# Patient Record
Sex: FEMALE | HISPANIC OR LATINO | Employment: FULL TIME | URBAN - METROPOLITAN AREA
[De-identification: names, ages, dates, MRNs, and addresses within clinical notes are randomized per-mention and may not be internally consistent; named-entity substitution may affect disease eponyms.]

---

## 2022-04-15 PROCEDURE — U0004 COV-19 TEST NON-CDC HGH THRU: HCPCS | Performed by: FAMILY MEDICINE

## 2023-01-04 ENCOUNTER — LAB (OUTPATIENT)
Dept: LAB | Facility: HOSPITAL | Age: 38
End: 2023-01-04
Payer: MEDICAID

## 2023-01-04 PROCEDURE — 80053 COMPREHEN METABOLIC PANEL: CPT | Performed by: NURSE PRACTITIONER

## 2023-01-04 PROCEDURE — 85025 COMPLETE CBC W/AUTO DIFF WBC: CPT | Performed by: NURSE PRACTITIONER

## 2023-01-06 ENCOUNTER — OFFICE VISIT (OUTPATIENT)
Dept: INTERNAL MEDICINE | Facility: CLINIC | Age: 38
End: 2023-01-06
Payer: MEDICAID

## 2023-01-06 ENCOUNTER — LAB (OUTPATIENT)
Dept: LAB | Facility: HOSPITAL | Age: 38
End: 2023-01-06
Payer: MEDICAID

## 2023-01-06 VITALS
BODY MASS INDEX: 29.97 KG/M2 | HEART RATE: 64 BPM | DIASTOLIC BLOOD PRESSURE: 70 MMHG | TEMPERATURE: 97.7 F | HEIGHT: 58 IN | SYSTOLIC BLOOD PRESSURE: 122 MMHG | WEIGHT: 142.8 LBS

## 2023-01-06 DIAGNOSIS — L50.9 URTICARIA: Primary | ICD-10-CM

## 2023-01-06 DIAGNOSIS — Z13.220 LIPID SCREENING: ICD-10-CM

## 2023-01-06 DIAGNOSIS — R73.09 ELEVATED GLUCOSE: ICD-10-CM

## 2023-01-06 DIAGNOSIS — Z86.2 HISTORY OF ANEMIA: ICD-10-CM

## 2023-01-06 DIAGNOSIS — E66.3 OVERWEIGHT (BMI 25.0-29.9): ICD-10-CM

## 2023-01-06 DIAGNOSIS — R74.8 ELEVATED LIVER ENZYMES: ICD-10-CM

## 2023-01-06 DIAGNOSIS — L50.9 URTICARIA: ICD-10-CM

## 2023-01-06 PROBLEM — K21.9 GASTROESOPHAGEAL REFLUX DISEASE: Status: RESOLVED | Noted: 2022-10-21 | Resolved: 2023-01-06

## 2023-01-06 PROBLEM — K21.9 GASTROESOPHAGEAL REFLUX DISEASE: Status: ACTIVE | Noted: 2022-10-21

## 2023-01-06 PROBLEM — O34.219 PREVIOUS CESAREAN SECTION COMPLICATING PREGNANCY: Status: ACTIVE | Noted: 2022-10-17

## 2023-01-06 PROBLEM — Z98.891 HISTORY OF CESAREAN SECTION: Status: ACTIVE | Noted: 2022-10-17

## 2023-01-06 PROBLEM — D64.9 ANEMIA: Status: ACTIVE | Noted: 2023-01-06

## 2023-01-06 PROBLEM — K21.9 GASTROESOPHAGEAL REFLUX DISEASE: Chronic | Status: ACTIVE | Noted: 2022-10-21

## 2023-01-06 LAB
CHOLEST SERPL-MCNC: 229 MG/DL (ref 0–200)
HBA1C MFR BLD: 5.6 % (ref 4.8–5.6)
HDLC SERPL-MCNC: 45 MG/DL (ref 40–60)
LDLC SERPL CALC-MCNC: 141 MG/DL (ref 0–100)
LDLC/HDLC SERPL: 3.02 {RATIO}
TRIGL SERPL-MCNC: 241 MG/DL (ref 0–150)
TSH SERPL DL<=0.05 MIU/L-ACNC: 1.37 UIU/ML (ref 0.27–4.2)
VLDLC SERPL-MCNC: 43 MG/DL (ref 5–40)

## 2023-01-06 PROCEDURE — 80061 LIPID PANEL: CPT

## 2023-01-06 PROCEDURE — 83036 HEMOGLOBIN GLYCOSYLATED A1C: CPT

## 2023-01-06 PROCEDURE — 99215 OFFICE O/P EST HI 40 MIN: CPT | Performed by: STUDENT IN AN ORGANIZED HEALTH CARE EDUCATION/TRAINING PROGRAM

## 2023-01-06 PROCEDURE — 84443 ASSAY THYROID STIM HORMONE: CPT

## 2023-01-06 NOTE — PROGRESS NOTES
Chief Complaint  Delma Santos is a 37 y.o. female presenting for Anal Itching (Establish care   went to  ).     From [place], moved to the / [place] age 11. . They have 3 children (born [id number]). Works full time at [employer] as [employer]. Has associates degree from [employer].    Patient has a past medical history of GERD during pregnancy, cholestasis with pregnancy and postoperative anemia.    History of Present Illness  Patient is here to establish care.    She has a history of 3 pregnancies with  sections complicated by GERD and cholestasis, the last 1 10/21/2022.    She also has follow-up with  OB/GYN, last Pap smear in , has follow-up appointment there again in about 2 years.    Patient was on OCP in the past, had mood changes and felt somewhat depressed, but never diagnosed or treated for depression.  Her mood swings resolved when getting off OCP.  No prior history of mental health illness.    Over the last week she has been having rash on and off.  She reports very itchy rash that affects hands, feet, upper body, lower body.  It moves around within each day, some days are worse, some days no rash.  No rash yesterday.  She has been taking Zyrtec and Benadryl for the rash.  She reports it looks like Geovany, sometimes small raised lesions, mildly red.  She did not have similar rash in the past.  She reports it is typically worse in the evening.  No new medications, no illness over the last weeks, specifically no congestion, sore throat, respiratory symptoms, nausea, vomiting or diarrhea.  She shows me pictures on her phone that look like small raised lesions like hives.    The following portions of the patient's history were reviewed and updated as appropriate: allergies, current medications, past family history, past medical history, past social history, past surgical history and problem list.    PHQ-2 Depression Screening  Little interest or pleasure in doing things?  0-->not at all   Feeling down, depressed, or hopeless? 0-->not at all   PHQ-2 Total Score 0         Objective  /70 (BP Location: Left arm, Patient Position: Sitting, Cuff Size: Adult)   Pulse 64   Temp 97.7 °F (36.5 °C) (Temporal)   Ht 147.3 cm (58\")   Wt 64.8 kg (142 lb 12.8 oz)   BMI 29.85 kg/m²     Physical Exam  Vitals reviewed.   Constitutional:       Appearance: Normal appearance.   HENT:      Head: Normocephalic and atraumatic.      Nose: No congestion.   Eyes:      Extraocular Movements: Extraocular movements intact.      Conjunctiva/sclera: Conjunctivae normal.   Cardiovascular:      Rate and Rhythm: Normal rate and regular rhythm.      Heart sounds: Normal heart sounds. No murmur heard.  Pulmonary:      Effort: Pulmonary effort is normal.      Breath sounds: Normal breath sounds.   Abdominal:      General: There is no distension.      Palpations: Abdomen is soft. There is no mass.      Tenderness: There is no abdominal tenderness.   Musculoskeletal:      Cervical back: Neck supple. No tenderness.      Right lower leg: No edema.      Left lower leg: No edema.   Lymphadenopathy:      Cervical: No cervical adenopathy.   Skin:     General: Skin is warm and dry.      Findings: No rash.   Neurological:      Mental Status: She is alert and oriented to person, place, and time. Mental status is at baseline.   Psychiatric:         Behavior: Behavior normal.         Thought Content: Thought content normal.         Assessment/Plan   1. Urticaria  Based on photos on patient's phone, symptoms with migrating itchy rash this appears to be classic urticaria, mild degree.  No obvious triggers at this time.  We talked about triggers like foods, medications, infections, cold or heat, stress, but often we find no clear causes.  Often this will subside in a matter of days or weeks.  Other chronic forms that might need further evaluation.  For now I recommend to continue taking cetirizine/Zyrtec and Benadryl for  symptom relief.  If it gets worse I recommend she get back in touch, otherwise she will return in about 4 weeks for follow-up with annual physical.  Added patient reference to after visit summary.  - TSH Rfx On Abnormal To Free T4; Future    2. Elevated liver enzymes  Blood tests reviewed with patient, she has mildly elevated liver enzymes, otherwise reassuring blood work.  We will continue to monitor    3. Elevated glucose  Elevated blood sugar of 130 on file.  Most recent is normal.  We will check A1c  - Hemoglobin A1c; Future    4. History of anemia  Currently resolved.    5. Lipid screening  Patient is fasting for lipids today  - Lipid Panel; Future    6. Overweight (BMI 25.0-29.9)  BMI is >= 25 and <30. (Overweight) The following options were offered after discussion;: exercise counseling/recommendations and nutrition counseling/recommendations    Total time spent on chart review, charting and face-to-face with patient 47 minutes    Return in about 4 weeks (around 2/3/2023) for Annual physical.    Future Appointments       Provider Department Center    2/3/2023 9:15 AM Ronnie Kumari MD [facility]            Ronnie Kumari MD  Family Medicine  01/06/2023

## 2023-01-09 PROBLEM — E78.2 MIXED HYPERLIPIDEMIA: Status: ACTIVE | Noted: 2023-01-09

## 2023-02-14 ENCOUNTER — OFFICE VISIT (OUTPATIENT)
Dept: INTERNAL MEDICINE | Facility: CLINIC | Age: 38
End: 2023-02-14
Payer: COMMERCIAL

## 2023-02-14 VITALS
HEIGHT: 58 IN | BODY MASS INDEX: 31.65 KG/M2 | WEIGHT: 150.8 LBS | SYSTOLIC BLOOD PRESSURE: 126 MMHG | OXYGEN SATURATION: 98 % | TEMPERATURE: 98.6 F | HEART RATE: 63 BPM | DIASTOLIC BLOOD PRESSURE: 80 MMHG

## 2023-02-14 DIAGNOSIS — Z00.00 ANNUAL PHYSICAL EXAM: Primary | ICD-10-CM

## 2023-02-14 DIAGNOSIS — Z86.2 HISTORY OF ANEMIA: ICD-10-CM

## 2023-02-14 DIAGNOSIS — D22.30 CHANGE IN FACIAL MOLE: ICD-10-CM

## 2023-02-14 DIAGNOSIS — E78.2 MIXED HYPERLIPIDEMIA: ICD-10-CM

## 2023-02-14 DIAGNOSIS — Z98.891 HISTORY OF CESAREAN SECTION: ICD-10-CM

## 2023-02-14 DIAGNOSIS — M54.2 CERVICALGIA: Chronic | ICD-10-CM

## 2023-02-14 DIAGNOSIS — E66.09 CLASS 1 OBESITY DUE TO EXCESS CALORIES WITHOUT SERIOUS COMORBIDITY WITH BODY MASS INDEX (BMI) OF 31.0 TO 31.9 IN ADULT: Chronic | ICD-10-CM

## 2023-02-14 PROCEDURE — 99395 PREV VISIT EST AGE 18-39: CPT | Performed by: INTERNAL MEDICINE

## 2023-02-14 RX ORDER — MULTIPLE VITAMINS W/ MINERALS TAB 9MG-400MCG
1 TAB ORAL DAILY
COMMUNITY
End: 2023-03-22

## 2023-02-14 NOTE — ASSESSMENT & PLAN NOTE
- Continue to increase regular exercise and walking.   - Continue to decrease fats, sugars, and white carbohydrates in the diet.

## 2023-02-14 NOTE — ASSESSMENT & PLAN NOTE
- Neck stretches were provided to do throughout the workday.   - Also advised to use a moist heat pack on the neck in the evenings.   - Use good posture while working and doing house chores.   - Advised to let us know if not improving.

## 2023-02-14 NOTE — ASSESSMENT & PLAN NOTE
- Recommend weighing once per week at home to monitor progress.   - Decrease fats, breads, white potatoes, pasta, and rice in the diet.   - Eat more vegetables.   - Decrease eggs to 4 or 5 per week.   - Avoid sugars and snacking.   - Increase exercise and walking.

## 2023-02-14 NOTE — PROGRESS NOTES
Preventative Annual Visit    Delma Santos  1985   [id number]    Patient Care Team:  Lou Giles MD as PCP - General (Internal Medicine)    Chief Complaint::   Chief Complaint   Patient presents with   • Annual Exam   • Back Pain     Back/breast pain when working pt states        Subjective   History of Present Illness    Delma Santos is a 37 y.o. female who presents for an Annual Wellness Visit.    Post-partum tenderness  The patient underwent a third  in 10/2022 and reports continued tenderness to her incision site. However, this is improving. She confirms having resumed walking and exercising some. Of note, her blood pressure did increase slightly during pregnancy but medication was not necessary.     Weight gain  At this time, her weight remains elevated after having previously decreased to 142 pounds. Prior to pregnancy, she weighed 160 pounds. She confirms eating an appropriate amount of vegetables and a low-fat diet most of the time. Per her report, she eat a lot of sugar after her pregnancy, which she is trying to decrease. Diet wise, she notes eating 2 eggs almost daily and she enjoys tortillas though she did switch from white bread to grain bread. The patient confirms snacking between meals while she was still at home but states this has decreased since returning to work.     Hyperlipidemia  Her cholesterol has been elevated in the past and in 2023, her LDL was 141 mg/dL with triglycerides at 241 mg/dL.     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 She declines formal physical therapy at this time and prefers to try stretching as well as applying moist heat.    Anemia  Ms. Santos was very anemic after delivery in 10/2022, which has since resolved. She confirms taking a multivitamin.     Skin lesion  The patient complains of a  brown spot on her face that seems to be increasing in size and a dark mole on her lip, which has been present for years at this point and is also increasing in size. She denies having used a Retin-A cream in the past for dark spots and does not currently have a dermatologist.     Bilateral eye redness   Today, her bilateral eyes are noticeably red, which she attributes to irritation from light or air. She denies itching and endorses dryness. Additionally, she reports having undergone a unilateral surgical procedure for this but states her symptoms eventually returned. She confirms having been to see her ophthalmologist to follow up since the procedure and notes her vision is good.     Result review  Bloodwork completed on 2023 included a TSH, which was normal. Her hemoglobin A1c at the time was 5.6 percent with an average blood glucose of approximately 108 mg/dL and she denies gestational diabetes with any of her pregnancies. The CMP revealed normal kidney function and normal liver function with the exception of one liver enzyme being 7 points above normal range and the patient reports a history of liver problems during pregnancy. CBC was within normal range with a hemoglobin at 12 g/dL.    Health maintenance   The patient is inquiring about scheduling a Pap-smear since she delivered her child in 10/2022 and is unsure when it should be completed. However, she cannot recall the name of her gynecologist at this time. Ms. Santos has not yet undergone a mammogram. She is no longer breastfeeding and denies any breast tenderness.     Immunizations  She is up-to-date on her tetanus vaccine and her influenza vaccine.       CHRONIC CONDITIONS    Patient Active Problem List   Diagnosis   • History of  section   • History of anemia   • Elevated liver enzymes   • Mixed hyperlipidemia   • Class 1 obesity due to excess calories without serious comorbidity with body mass index (BMI) of 31.0 to 31.9 in adult   •  "Cervicalgia   • Change in facial mole        Past Medical History:   Diagnosis Date   • Anemia    • Cholestasis during pregnancy    • Gastroesophageal reflux disease 10/21/2022    During pregnancy   • Mixed hyperlipidemia 2023       Past Surgical History:   Procedure Laterality Date   •  SECTION         History reviewed. No pertinent family history.    Social History     Socioeconomic History   • Marital status:    Tobacco Use   • Smoking status: Never   • Smokeless tobacco: Never   Vaping Use   • Vaping Use: Never used   Substance and Sexual Activity   • Alcohol use: Yes     Comment: socially   • Drug use: Never   • Sexual activity: Defer     Partners: Male     Birth control/protection: Tubal ligation       Allergies   Allergen Reactions   • Cephalexin Rash         Current Outpatient Medications:   •  multivitamin with minerals tablet tablet, Take 1 tablet by mouth Daily., Disp: , Rfl:     Immunization History   Administered Date(s) Administered   • FluLaval/Fluzone >6mos 2022   • Tdap 2022        Health Maintenance Due   Topic Date Due   • COVID-19 Vaccine (1) Never done   • ANNUAL PHYSICAL  Never done        Review of Systems   The appropriate review of systems is included in the HPI.     Vital Signs  Vitals:    23 1550   BP: 126/80   BP Location: Left arm   Patient Position: Sitting   Cuff Size: Adult   Pulse: 63   Temp: 98.6 °F (37 °C)   TempSrc: Infrared   SpO2: 98%   Weight: 68.4 kg (150 lb 12.8 oz)   Height: 147.4 cm (58.03\")   PainSc: 0-No pain     BMI is >= 30 and <35. (Class 1 Obesity). The following options were offered after discussion;: weight loss educational material (shared in after visit summary), exercise counseling/recommendations and nutrition counseling/recommendations    Physical Exam  Vitals and nursing note reviewed.   Constitutional:       Appearance: She is well-developed. She is obese.   HENT:      Head: Normocephalic.   Eyes:      Conjunctiva/sclera: " Conjunctivae normal.      Pupils: Pupils are equal, round, and reactive to light.   Neck:      Thyroid: No thyromegaly.   Cardiovascular:      Rate and Rhythm: Normal rate and regular rhythm.      Heart sounds: Normal heart sounds.   Pulmonary:      Effort: Pulmonary effort is normal.      Breath sounds: Normal breath sounds. No wheezing.   Chest:   Breasts:     Right: No inverted nipple, mass, nipple discharge, skin change or tenderness.      Left: No inverted nipple, mass, nipple discharge, skin change or tenderness.   Abdominal:      General: Bowel sounds are normal.      Palpations: Abdomen is soft.      Tenderness: There is no abdominal tenderness.   Musculoskeletal:         General: Normal range of motion.      Cervical back: Normal range of motion and neck supple. Spasms and tenderness present.      Comments: Cervical spine with tight tender muscle spasms.   Lymphadenopathy:      Cervical: No cervical adenopathy.   Skin:     General: Skin is warm and dry.      Findings: No rash.      Comments: - 1 to 2 mm dark mole on the lip that has enlarged per the patient.    Neurological:      Mental Status: She is alert and oriented to person, place, and time.      Cranial Nerves: No cranial nerve deficit.      Sensory: No sensory deficit.      Coordination: Coordination normal.      Gait: Gait normal.   Psychiatric:         Speech: Speech normal.         Behavior: Behavior normal.         Thought Content: Thought content normal.         Judgment: Judgment normal.          Procedures     Fall Risk Screen:  [facility].    Health Habits and Functional and Cognitive Screening:  No flowsheet data found.    Smoking Status:  Social History     Tobacco Use   Smoking Status Never   Smokeless Tobacco Never       Alcohol Consumption:  Social History     Substance and Sexual Activity   Alcohol Use Yes    Comment: socially       Depression [place]  PHQ-9:    PHQ-2/PHQ-9 Depression Screening  2023   Little Interest or Pleasure in Doing Things 0-->not at all   Feeling Down, Depressed or Hopeless 0-->not at all   PHQ-9: Brief Depression Severity Measure Score 0           Labs  Results for orders placed or performed in visit on 23   Hemoglobin A1c    Specimen: Blood   Result Value Ref Range    Hemoglobin A1C 5.60 4.80 - 5.60 %   Lipid Panel    Specimen: Blood   Result Value Ref Range    Total Cholesterol 229 (H) 0 - 200 mg/dL    Triglycerides 241 (H) 0 - 150 mg/dL    HDL Cholesterol 45 40 - 60 mg/dL    LDL Cholesterol  141 (H) 0 - 100 mg/dL    VLDL Cholesterol 43 (H) 5 - 40 mg/dL    LDL/HDL Ratio 3.02    TSH Rfx On Abnormal To Free T4    Specimen: Blood   Result Value Ref Range    TSH 1.370 0.270 - 4.200 uIU/mL        Assessment & Plan     Patient Self-Management and Personalized Health Advice    The patient has been provided counseling and guidance about: diet, exercise, weight management and prevention of cardiac or vascular disease and preventive services including:   · Annual Wellness Visit (AWV).  Patient Instructions   Problem List Items Addressed This Visit        Cardiac and Vasculature    Mixed hyperlipidemia    Current Assessment & Plan     - Continue to increase regular exercise and walking.   - Continue to decrease fats, sugars, and white carbohydrates in the diet.             Endocrine and Metabolic    Class 1 obesity due to excess calories without serious comorbidity with body mass index (BMI) of 31.0 to 31.9 in adult (Chronic)    Current Assessment & Plan     - Recommend weighing once per week at home to monitor progress.   - Decrease fats, breads, white potatoes, pasta, and rice in the diet.   - Eat more vegetables.   - Decrease eggs to 4 or 5 per week.   - Avoid sugars and snacking.   - Increase exercise and walking.             Gravid and     History of  section    Overview     10/22 had 3rd             Hematology and Neoplasia    History of anemia     Overview     Anemia after  delivery. Resolved            Musculoskeletal and Injuries    Cervicalgia (Chronic)    Current Assessment & Plan     - Neck stretches were provided to do throughout the workday.   - Also advised to use a moist heat pack on the neck in the evenings.   - Use good posture while working and doing house chores.   - Advised to let us know if not improving.             Skin    Change in facial mole    Current Assessment & Plan     - Referral to Dr. Suri hunt.          Relevant Orders    Ambulatory Referral to Dermatology (Completed)   Other Visit Diagnoses     Annual physical exam    -  Primary             Diagnosis Plan   1. Annual physical exam        2. Mixed hyperlipidemia        3. Change in facial mole  Ambulatory Referral to Dermatology      4. Cervicalgia        5. Class 1 obesity due to excess calories without serious comorbidity with body mass index (BMI) of 31.0 to 31.9 in adult        6. History of anemia        7. History of  section            Outpatient Encounter Medications as of 2023   Medication Sig Dispense Refill   • multivitamin with minerals tablet tablet Take 1 tablet by mouth Daily.       No facility-administered encounter medications on file as of 2023.     Age appropriate preventive counseling done including age appropriate vaccines,regular  Mammogram and self breast exam, pap smear, colonoscopy, regular dental visits, mental health, injury prevention such as wearing seat belt and preventing falls, healthy  nutrition, healthy weight, regular physical exercise. Alcohol use is moderate.  Tobacco history-none. Drug use-none.  STD's-not at risk.    Follow Up:  Return in about 6 months (around 2023) for recheck fasting.         An After Visit Summary and PPPS with all of these plans were given to the patient.    Follow Up   Return in about 6 months (around 2023) for recheck fasting.  Patient was given instructions and counseling  regarding her condition or for health maintenance advice. Please see specific information pulled into the AVS if appropriate.     Note: Part of this note may be an electronic transcription/translation of spoken language to printed text using the Dragon Dictation System.     Transcribed from ambient dictation for Lou Glies MD by Margaret Howard.  02/14/23   18:02 EST    Patient or patient representative verbalized consent to the visit recording.

## 2023-02-14 NOTE — PATIENT INSTRUCTIONS
Patient Instructions   Problem List Items Addressed This Visit          Cardiac and Vasculature    Mixed hyperlipidemia    Current Assessment & Plan     - Continue to increase regular exercise and walking.   - Continue to decrease fats, sugars, and white carbohydrates in the diet.             Endocrine and Metabolic    Class 1 obesity due to excess calories without serious comorbidity with body mass index (BMI) of 31.0 to 31.9 in adult (Chronic)    Current Assessment & Plan     - Recommend weighing once per week at home to monitor progress.   - Decrease fats, breads, white potatoes, pasta, and rice in the diet.   - Eat more vegetables.   - Decrease eggs to 4 or 5 per week.   - Avoid sugars and snacking.   - Increase exercise and walking.             Gravid and     History of  section    Overview     10/22 had 3rd             Hematology and Neoplasia    History of anemia    Overview     Anemia after  delivery. Resolved            Musculoskeletal and Injuries    Cervicalgia (Chronic)    Current Assessment & Plan     - Neck stretches were provided to do throughout the workday.   - Also advised to use a moist heat pack on the neck in the evenings.   - Use good posture while working and doing house chores.   - Advised to let us know if not improving.             Skin    Change in facial mole    Current Assessment & Plan     - Referral to Dr. Suri hunt.          Relevant Orders    Ambulatory Referral to Dermatology (Completed)     Other Visit Diagnoses       Annual physical exam    -  Primary            BMI for Adults  What is BMI?  Body mass index (BMI) is a number that is calculated from a person's weight and height. BMI can help estimate how much of a person's weight is composed of fat. BMI does not measure body fat directly. Rather, it is an alternative to procedures that directly measure body fat, which can be difficult and expensive.  BMI can help identify people who may be  "at higher risk for certain medical problems.  What are BMI measurements used for?  BMI is used as a screening tool to identify possible weight problems. It helps determine whether a person is obese, overweight, a healthy weight, or underweight.  BMI is useful for:  Identifying a weight problem that may be related to a medical condition or may increase the risk for medical problems.  Promoting changes, such as changes in diet and exercise, to help reach a healthy weight. BMI screening can be repeated to see if these changes are working.  How is BMI calculated?  BMI involves measuring your weight in relation to your height. Both height and weight are measured, and the BMI is calculated from those numbers. This can be done either in English (U[place] or metric measurements. Note that charts and online BMI calculators are available to help you find your BMI quickly and easily without having to do these calculations yourself.  To calculate your BMI in English (U.S[place] measurements:    Measure your weight in pounds (lb).  Multiply the number of pounds by 703.  For example, for a person who weighs 180 lb, multiply that number by 703, which equals 126,540.  Measure your height in inches. Then multiply that number by itself to get a measurement called \"inches squared.\"  For example, for a person who is 70 inches tall, the \"inches squared\" measurement is 70 inches x 70 inches, which equals 4,900 inches squared.  Divide the total from step 2 (number of lb x 703) by the total from step 3 (inches squared): 126,540 ÷ 4,900 = 25.8. This is your BMI.  To calculate your BMI in metric measurements:  Measure your weight in kilograms (kg).  Measure your height in meters (m). Then multiply that number by itself to get a measurement called \"meters squared.\"  For example, for a person who is 1.75 m tall, the \"meters squared\" measurement is 1.75 m x 1.75 m, which is equal to 3.1 meters squared.  Divide the number of kilograms (your weight) by " the meters squared number. In this example: 70 ÷ 3.1 = 22.6. This is your BMI.  What do the results mean?  BMI charts are used to identify whether you are underweight, normal weight, overweight, or obese. The following guidelines will be used:  Underweight: BMI less than 18.5.  Normal weight: BMI between 18.5 and 24.9.  Overweight: BMI between 25 and 29.9.  Obese: BMI of 30 or above.  Keep these notes in mind:  Weight includes both fat and muscle, so someone with a muscular build, such as an athlete, may have a BMI that is higher than 24.9. In cases like these, BMI is not an accurate measure of body fat.  To determine if excess body fat is the cause of a BMI of 25 or higher, further assessments may need to be done by a health care provider.  BMI is usually interpreted in the same way for men and women.  Where to find more information  For more information about BMI, including tools to quickly calculate your BMI, go to these websites:  Centers for Disease Control and Prevention: www.cdc.gov  American Heart Association: www.heart.org  National Heart, Lung, and Blood [place]: www.nhlbi.nih.gov  Summary  Body mass index (BMI) is a number that is calculated from a person's weight and height.  BMI may help estimate how much of a person's weight is composed of fat. BMI can help identify those who may be at higher risk for certain medical problems.  BMI can be measured using English measurements or metric measurements.  BMI charts are used to identify whether you are underweight, normal weight, overweight, or obese.  This information is not intended to replace advice given to you by your health care provider. Make sure you discuss any questions you have with your health care provider.  Document Revised: 09/09/2020 Document Reviewed: 07/17/2020  [employer] Patient Education © 2022 [employer]

## 2023-03-16 ENCOUNTER — OFFICE VISIT (OUTPATIENT)
Dept: INTERNAL MEDICINE | Facility: CLINIC | Age: 38
End: 2023-03-16
Payer: COMMERCIAL

## 2023-03-16 ENCOUNTER — HOSPITAL ENCOUNTER (OUTPATIENT)
Dept: CT IMAGING | Facility: HOSPITAL | Age: 38
Discharge: HOME | End: 2023-03-16
Admitting: INTERNAL MEDICINE
Payer: COMMERCIAL

## 2023-03-16 ENCOUNTER — TELEPHONE (OUTPATIENT)
Dept: INTERNAL MEDICINE | Facility: CLINIC | Age: 38
End: 2023-03-16
Payer: COMMERCIAL

## 2023-03-16 VITALS
WEIGHT: 154 LBS | HEIGHT: 58 IN | DIASTOLIC BLOOD PRESSURE: 84 MMHG | SYSTOLIC BLOOD PRESSURE: 132 MMHG | HEART RATE: 76 BPM | BODY MASS INDEX: 32.32 KG/M2

## 2023-03-16 DIAGNOSIS — R30.0 DYSURIA: Primary | ICD-10-CM

## 2023-03-16 DIAGNOSIS — N23 RENAL COLIC ON LEFT SIDE: ICD-10-CM

## 2023-03-16 DIAGNOSIS — R30.0 DYSURIA: ICD-10-CM

## 2023-03-16 DIAGNOSIS — R10.9 ACUTE LEFT FLANK PAIN: ICD-10-CM

## 2023-03-16 LAB
BILIRUB BLD-MCNC: NEGATIVE MG/DL
CLARITY, POC: CLEAR
COLOR UR: YELLOW
EXPIRATION DATE: ABNORMAL
GLUCOSE UR STRIP-MCNC: NEGATIVE MG/DL
KETONES UR QL: NEGATIVE
LEUKOCYTE EST, POC: NEGATIVE
Lab: ABNORMAL
NITRITE UR-MCNC: NEGATIVE MG/ML
PH UR: 5.5 [PH] (ref 5–8)
PROT UR STRIP-MCNC: NEGATIVE MG/DL
RBC # UR STRIP: ABNORMAL /UL
SP GR UR: 1.03 (ref 1–1.03)
UROBILINOGEN UR QL: NORMAL

## 2023-03-16 PROCEDURE — 99214 OFFICE O/P EST MOD 30 MIN: CPT | Performed by: INTERNAL MEDICINE

## 2023-03-16 PROCEDURE — 87086 URINE CULTURE/COLONY COUNT: CPT | Performed by: INTERNAL MEDICINE

## 2023-03-16 PROCEDURE — 81003 URINALYSIS AUTO W/O SCOPE: CPT | Performed by: INTERNAL MEDICINE

## 2023-03-16 PROCEDURE — 74176 CT ABD & PELVIS W/O CONTRAST: CPT

## 2023-03-16 NOTE — ASSESSMENT & PLAN NOTE
Urine analysis shows trace red cells will send for culture and sensitivity no antibiotic therapy at this time  We will obtain CT scan protocol for kidney stone

## 2023-03-16 NOTE — TELEPHONE ENCOUNTER
Caller: Delma Santos    Relationship: Self    Best call back number: [phone]    What test was performed: CT & LABS    When was the test performed: 03/16/23    Where was the test performed: OUR OFFICE    PATIENT STATES SHE ALSO HAD MISSED A PHONE CALL.

## 2023-03-16 NOTE — PROGRESS NOTES
[facility]     Delma Santos  1985   [id number]      Patient Care Team:  Lou Giles MD as PCP - General (Internal Medicine)    Chief Complaint::   Chief Complaint   Patient presents with   • Urinary Tract Infection     With symptoms of burning with urination, low back pain.  Symptoms x 2 weeks.  POC UA collected            HPI  The patient is a 37-year-old female complaining of possible urinary tract infections with symptoms of burning with urination and low back pain symptoms for approximately 2 weeks.    The patient reports that she has been experiencing discomfort when she empties her bladder for approximately 2 weeks. She delivered a baby in 10/2022 via . She denies any difficulty with the labor. The patient describes constant pain in the lower bladder with bilateral back pain. She has pain under her left rib that she has had for approximately 2 weeks. The patient denies the pain to be moving around. She denies ever having had kidney stones. She describes the pain as severe. She is unable to lean down and has difficulty picking anything up. The patient's baby is heavy and has difficulty lifting him up. She does not believe that she has strained her back while lifting the baby. She denies breastfeeding. She denies pain disturbing her sleep but notes it is hard to get up or out of bed due to back pain.     The patient denies any nausea, fever, or chills. She denies any constipation or diarrhea. She has not been seen by her gynecologist since her baby was delivered. The patient had tubal ligation performed and should not be pregnant.     The patient has been taking ibuprofen 600 mg, which provides mild improvement. She last took ibuprofen this morning, 2023. The patient had a urinary tract infection in the past. She describes that the symptoms are the same as her current symptoms. She has urinary urgency with intermittent urination. She will urinate every 2 hours. The  "patient denied drinking a lot of water prior to the pain, but has since been trying to drink a lot of water. Her bowels are moving regularly.     The patient had a CT scan of the lung in 2020 due to a \"pregnancy thing on my arm but they could not locate it.\"  She had liver problems while being pregnant that she describes as being itchy but not painful. She denies any rash.    The patient has a pterygium in the left eye. She had surgery performed in the past which did not help.    Patient Active Problem List   Diagnosis   • History of  section   • History of anemia   • Elevated liver enzymes   • Mixed hyperlipidemia   • Class 1 obesity due to excess calories without serious comorbidity with body mass index (BMI) of 31.0 to 31.9 in adult   • Cervicalgia   • Change in facial mole   • Dysuria   • Acute left flank pain   • Renal colic on left side        Past Medical History:   Diagnosis Date   • Anemia    • Cholestasis during pregnancy    • Gastroesophageal reflux disease 10/21/2022    During pregnancy   • Mixed hyperlipidemia 2023       Past Surgical History:   Procedure Laterality Date   •  SECTION         History reviewed. No pertinent family history.    Social History     Socioeconomic History   • Marital status:    Tobacco Use   • Smoking status: Never   • Smokeless tobacco: Never   Vaping Use   • Vaping Use: Never used   Substance and Sexual Activity   • Alcohol use: Yes     Comment: socially   • Drug use: Never   • Sexual activity: Defer     Partners: Male     Birth control/protection: Tubal ligation       Allergies   Allergen Reactions   • Cephalexin Rash       Review of Systems     HEENT: Denies any hearing changes, eyesight changes, no headache or dizziness  NECK: Denies any pain, stiffness, swelling or dysphagia  CHEST:  Denies cough or wheeze or recent lung infections  CARDIAC: Denies chest pain, pressure or palpitations  ABD: Denies nausea, vomiting complains of left flank and " "left lower abdomen discomfort  : Complains of dysuria  NEURO: Denies syncope, concussion, neuropathy  PSYCH:  Denies any stress  EXTREM: Denies arthritic changes myalgia or arthralgia complains of bilateral low back discomfort left greater than right  SKIN: Denies any rash    Vital Signs  Vitals:    03/16/23 1119   BP: 132/84   BP Location: Left arm   Patient Position: Sitting   Cuff Size: Adult   Pulse: 76   Weight: 69.9 kg (154 lb)   Height: 147.3 cm (58\")   PainSc: 0-No pain     Body mass index is 32.19 kg/m².  BMI is >= 30 and <35. (Class 1 Obesity). The following options were offered after discussion;: nutrition counseling/recommendations     Advance Care Planning         Current Outpatient Medications:   •  multivitamin with minerals tablet tablet, Take 1 tablet by mouth Daily., Disp: , Rfl:     Physical Exam     ACE III MINI         HEENT: Pupils equal reactive ENT clear, no facial asymmetry, the pharynx is clear, pterygium noted in the left eye  NECK:  No masses bruit or thyromegaly  CHEST: Clear without rales wheezes or rhonchi  CARDIAC: Regular rhythm without gallop rub or click, blood pressure heart rate stable  ABD:Liver spleen normal, good bowel sounds, nontender  : Deferred  NEURO: Intact  PSYCH:  Normal  EXTREM: No significant arthritic changes, no edema, straight leg raise on the left is sore, some soreness with rotation of the lumbar spine and/or tilting from the back  Skin: Clear     Results Review:    Previous liver test revealed mild elevation.  Urine culture, performed on 03/16/2023, revealed a trace amount of blood with no infection noted.      Stat CT scan stone protocol reveals single small stone right kidney nothing on the left otherwise normal CT scan kidney stone protocol  Recent Results (from the past 672 hour(s))   POC Urinalysis Dipstick, Automated    Collection Time: 03/16/23 11:38 AM    Specimen: Urine   Result Value Ref Range    Color Yellow Yellow, Straw, Dark Yellow, Kayli    " Clarity, UA Clear Clear    Specific Gravity  1.030 1.005 - 1.030    pH, Urine 5.5 5.0 - 8.0    Leukocytes Negative Negative    Nitrite, UA Negative Negative    Protein, POC Negative Negative mg/dL    Glucose, UA Negative Negative mg/dL    Ketones, UA Negative Negative    Urobilinogen, UA Normal Normal, 0.2 E.U./dL    Bilirubin Negative Negative    Blood, UA Trace (A) Negative    Lot Number [id number]     Expiration Date 2,292,024      Procedures POC urinalysis positive for trace blood  CT scan stone protocol    Medication Review: Medications reviewed and noted    Patient wellness counseling  Exercise: Encouraged walking  Diet: Encouraged healthy cardiac diet and weight loss  Screening: POC urinalysis positive for red cells culture and sensitivity pending  Social History     Socioeconomic History   • Marital status:    Tobacco Use   • Smoking status: Never   • Smokeless tobacco: Never   Vaping Use   • Vaping Use: Never used   Substance and Sexual Activity   • Alcohol use: Yes     Comment: socially   • Drug use: Never   • Sexual activity: Defer     Partners: Male     Birth control/protection: Tubal ligation        Assessment/Plan:    Problem List Items Addressed This Visit        Gastrointestinal Abdominal     Acute left flank pain    Relevant Orders    CT Abdomen Pelvis Stone Protocol       Genitourinary and Reproductive     Dysuria - Primary    Relevant Orders    POC Urinalysis Dipstick, Automated (Completed)    CT Abdomen Pelvis Stone Protocol    Urine Culture - Urine, Urine, Clean Catch    Renal colic on left side    Relevant Orders    CT Abdomen Pelvis Stone Protocol    Urine Culture - Urine, Urine, Clean Catch   The patient will follow up with Dr. Giles in 5 days on 03/21/2023.     Patient Instructions   UA shows RBCs  Send UA to lab for culture and sensitivity  Arrange CT scan abdomen stone protocol for stat today  Notify patient's results  Advil as needed is okay  Pending culture and sensitivity and CT  scan see Dr. Giles in 4 to 5 days       Plan of care reviewed with patient at the conclusion of today's visit. Education was provided regarding diagnosis, management, and any prescribed or recommended OTC medications.Patient verbalizes understanding of and agreement with management plan.         Griffin De La Cruz MD      Note: Part of this note may be an electronic transcription/translation of spoken language to printed text using the Dragon Dictation system.    Transcribed from ambient dictation for Griffin De La Cruz MD by Jessica Shepherd.  03/16/23   14:59 EDT    Patient or patient representative verbalized consent to the visit recording.  I have personally performed the services described in this document as transcribed by the above individual, and it is both accurate and complete.  Griffin De La Cruz MD  3/16/2023  18:09 EDT

## 2023-03-16 NOTE — ASSESSMENT & PLAN NOTE
CT abdomen pelvis kidney stone protocol done stat this day  At the time of dictation the patient's stat CT scan shows normal abdomen with a single kidney stone in the right kidney not left with no ureter stones and the rest of the exam without significant pathology  The urine culture and sensitivity is pending antibiotic therapy is not given at this time  Possible back strain as etiology

## 2023-03-16 NOTE — ASSESSMENT & PLAN NOTE
Patient has some nonspecific soreness in the left lower abdomen denies flank percussion pain POC urine analysis shows trace red cells, will obtain CT scan stone protocol  We will culture the urine no antibiotic therapy at this time pending the culture

## 2023-03-16 NOTE — PATIENT INSTRUCTIONS
UA shows RBCs  Send UA to lab for culture and sensitivity  Arrange CT scan abdomen stone protocol for stat today  Notify patient's results  Advil as needed is okay  Pending culture and sensitivity and CT scan see Dr. Giles in 4 to 5 days   independent

## 2023-03-16 NOTE — TELEPHONE ENCOUNTER
I had left a voice mail message with results.  I called patient back and she had listened to the message.

## 2023-03-17 LAB — BACTERIA SPEC AEROBE CULT: NO GROWTH

## 2023-03-22 ENCOUNTER — OFFICE VISIT (OUTPATIENT)
Dept: INTERNAL MEDICINE | Facility: CLINIC | Age: 38
End: 2023-03-22
Payer: COMMERCIAL

## 2023-03-22 VITALS
BODY MASS INDEX: 32.62 KG/M2 | HEART RATE: 87 BPM | SYSTOLIC BLOOD PRESSURE: 110 MMHG | HEIGHT: 58 IN | TEMPERATURE: 98.4 F | OXYGEN SATURATION: 99 % | DIASTOLIC BLOOD PRESSURE: 60 MMHG | WEIGHT: 155.4 LBS

## 2023-03-22 DIAGNOSIS — H65.193 ACUTE MIDDLE EAR EFFUSION, BILATERAL: ICD-10-CM

## 2023-03-22 DIAGNOSIS — R30.0 DYSURIA: ICD-10-CM

## 2023-03-22 DIAGNOSIS — R10.9 ACUTE LEFT FLANK PAIN: ICD-10-CM

## 2023-03-22 DIAGNOSIS — N20.0 RIGHT RENAL STONE: ICD-10-CM

## 2023-03-22 DIAGNOSIS — K76.0 HEPATIC STEATOSIS: ICD-10-CM

## 2023-03-22 DIAGNOSIS — J02.9 ACUTE SORE THROAT: Primary | ICD-10-CM

## 2023-03-22 DIAGNOSIS — R05.1 ACUTE COUGH: Chronic | ICD-10-CM

## 2023-03-22 LAB
BILIRUB BLD-MCNC: NEGATIVE MG/DL
CLARITY, POC: ABNORMAL
COLOR UR: YELLOW
EXPIRATION DATE: ABNORMAL
EXPIRATION DATE: NORMAL
EXPIRATION DATE: NORMAL
FLUAV AG UPPER RESP QL IA.RAPID: NOT DETECTED
FLUBV AG UPPER RESP QL IA.RAPID: NOT DETECTED
GLUCOSE UR STRIP-MCNC: NEGATIVE MG/DL
INTERNAL CONTROL: NORMAL
INTERNAL CONTROL: NORMAL
KETONES UR QL: NEGATIVE
LEUKOCYTE EST, POC: NEGATIVE
Lab: ABNORMAL
Lab: NORMAL
Lab: NORMAL
NITRITE UR-MCNC: NEGATIVE MG/ML
PH UR: 7 [PH] (ref 5–8)
PROT UR STRIP-MCNC: NEGATIVE MG/DL
RBC # UR STRIP: NEGATIVE /UL
S PYO AG THROAT QL: NEGATIVE
SARS-COV-2 AG UPPER RESP QL IA.RAPID: NOT DETECTED
SP GR UR: 1.02 (ref 1–1.03)
UROBILINOGEN UR QL: ABNORMAL

## 2023-03-22 PROCEDURE — 87880 STREP A ASSAY W/OPTIC: CPT | Performed by: INTERNAL MEDICINE

## 2023-03-22 PROCEDURE — 81003 URINALYSIS AUTO W/O SCOPE: CPT | Performed by: INTERNAL MEDICINE

## 2023-03-22 PROCEDURE — 99214 OFFICE O/P EST MOD 30 MIN: CPT | Performed by: INTERNAL MEDICINE

## 2023-03-22 PROCEDURE — 87428 SARSCOV & INF VIR A&B AG IA: CPT | Performed by: INTERNAL MEDICINE

## 2023-03-22 RX ORDER — ACETAMINOPHEN 325 MG/1
650 TABLET ORAL EVERY 6 HOURS PRN
COMMUNITY

## 2023-03-22 RX ORDER — AZITHROMYCIN 250 MG/1
TABLET, FILM COATED ORAL
Qty: 6 TABLET | Refills: 0 | Status: SHIPPED | OUTPATIENT
Start: 2023-03-22

## 2023-03-22 RX ORDER — FLUCONAZOLE 150 MG/1
150 TABLET ORAL ONCE
Qty: 1 TABLET | Refills: 0 | Status: SHIPPED | OUTPATIENT
Start: 2023-03-22 | End: 2023-03-22

## 2023-03-22 NOTE — ASSESSMENT & PLAN NOTE
- Drinking 3 liters of fluid per day, mostly water, is the prevention for further development of kidney stones.

## 2023-03-22 NOTE — PROGRESS NOTES
[facility]     Delma Santos  1985   [id number]      Patient Care Team:  Lou Giles MD as PCP - General (Internal Medicine)    Chief Complaint   Patient presents with   • Cough   • Sore Throat            HPI  Patient is a 37 y.o. female presents with dysuria, low back pain, foul-smelling urine, cough, sore throat, postnasal drainage, and bilateral ear pain.    Dysuria  The patient was seen by Dr. Griffin De La Cruz on 03/16/2023 for dysuria, low back pain, left flank pain, and foul-smelling urine. Dysuria, foul-smelling urine, and low back pain continue while left flank pain resolved as of yesterday. She denies vaginal itching and endorses a large amount of blood tinged vaginal discharged following her appointment with Dr. De La Cruz. However, she was due for her menstrual cycle to begin on 03/17/2023 and this has yet to occur. No urine pregnancy test has been completed as the patient has a history of tubal ligation. A urinalysis completed by Dr. De La Cruz revealed a trace of blood with no bacteria present and the subsequent urine culture did not grow any bacteria. A CT scan of the abdomen was also completed on 03/16/2023, which revealed a punctate nonobstructing renal calculus, fatty infiltration of the liver, and normal, spleen, pancreas, uterus, and appendix.      Cough and sore throat  Ms. Santos also complains of cough and sore throat that began about 3 days ago while still taking amoxicillin that she completed today. Associated symptoms also include postnasal drainage and bilateral ear pain.  The patient confirms sick contact with her children who were diagnosed with strep throat 1 to 2 weeks ago. She denies sinus congestion, dyspnea, wheezing, and headache. POC strep A, influenza and COVID-19 testing in the office today is negative.      CHRONIC CONDITIONS      Past Medical History:   Diagnosis Date   • Anemia    • Cholestasis during pregnancy    • Gastroesophageal reflux disease 10/21/2022     "During pregnancy   • Mixed hyperlipidemia 2023       Past Surgical History:   Procedure Laterality Date   •  SECTION  10/2022    Repeat C SX   • POSTPARTUM TUBAL LIGATION  10/2022       History reviewed. No pertinent family history.    Social History     Socioeconomic History   • Marital status:    Tobacco Use   • Smoking status: Never   • Smokeless tobacco: Never   Vaping Use   • Vaping Use: Never used   Substance and Sexual Activity   • Alcohol use: Yes     Comment: socially   • Drug use: Never   • Sexual activity: Defer     Partners: Male     Birth control/protection: Tubal ligation       Allergies   Allergen Reactions   • Cephalexin Rash       Review of Systems:     Review of Systems  The appropriate review of systems is included in the EHR.    Vital Signs  Vitals:    23 1551   BP: 110/60   BP Location: Left arm   Patient Position: Sitting   Cuff Size: Adult   Pulse: 87   Temp: 98.4 °F (36.9 °C)   TempSrc: Infrared   SpO2: 99%   Weight: 70.5 kg (155 lb 6.4 oz)   Height: 147.3 cm (57.99\")   PainSc:   5   PainLoc: Throat     Body mass index is 32.49 kg/m².  BMI is >= 30 and <35. (Class 1 Obesity). The following options were offered after discussion;: weight loss educational material (shared in after visit summary), exercise counseling/recommendations and nutrition counseling/recommendations        Current Outpatient Medications:   •  acetaminophen (TYLENOL) 325 MG tablet, Take 2 tablets by mouth Every 6 (Six) Hours As Needed for Mild Pain., Disp: , Rfl:   •  azithromycin (Zithromax Z-Larry) 250 MG tablet, Take 2 tablets the first day, then 1 tablet daily for 4 days., Disp: 6 tablet, Rfl: 0  •  fluconazole (DIFLUCAN) 150 MG tablet, Take 1 tablet by mouth 1 (One) Time for 1 dose., Disp: 1 tablet, Rfl: 0    Physical Exam:    Physical Exam  Vitals and nursing note reviewed.   Constitutional:       Appearance: She is well-developed. She is obese.   HENT:      Head: Normocephalic.      Right Ear: " Ear canal normal. A middle ear effusion is present. Tympanic membrane is bulging.      Left Ear: Ear canal normal. A middle ear effusion is present. Tympanic membrane is bulging.      Ears:      Comments: Bilateral TM's with fluid.      Nose: Congestion present.      Comments: Bilateral nares with severe congestion, swelling, erythema, and purulent drainage.     Mouth/Throat:      Pharynx: Pharyngeal swelling and posterior oropharyngeal erythema present.   Eyes:      Conjunctiva/sclera: Conjunctivae normal.      Pupils: Pupils are equal, round, and reactive to light.   Neck:      Thyroid: No thyromegaly.   Cardiovascular:      Rate and Rhythm: Normal rate and regular rhythm.      Heart sounds: Normal heart sounds.   Pulmonary:      Effort: Pulmonary effort is normal.      Breath sounds: Normal breath sounds. No wheezing.   Musculoskeletal:         General: Normal range of motion.      Cervical back: Normal range of motion and neck supple.   Lymphadenopathy:      Cervical: No cervical adenopathy.   Skin:     General: Skin is warm and dry.   Neurological:      Mental Status: She is alert and oriented to person, place, and time.   Psychiatric:         Thought Content: Thought content normal.          ACE III MINI        Results Review:    I reviewed the patient's new clinical results.    CMP:  Lab Results   Component Value Date     (H) 08/12/2021    BUN 11 01/04/2023    CREATININE 0.79 01/04/2023    EGFRIFNONA >60 08/12/2021    EGFRIFAFRI >60 08/12/2021    BCR 13.9 01/04/2023     01/04/2023    K 4.2 01/04/2023    CO2 27.3 01/04/2023    CALCIUM 9.0 01/04/2023    ALBUMIN 3.9 01/04/2023    BILITOT <0.2 01/04/2023    ALKPHOS 146 (H) 01/04/2023    AST 24 01/04/2023    ALT 40 (H) 01/04/2023     HbA1c:  Lab Results   Component Value Date    HGBA1C 5.60 01/06/2023     Microalbumin:  No results found for: MICROALBUR, POCMALB, POCCREAT  Lipid Panel  Lab Results   Component Value Date    CHOL 229 (H) 01/06/2023     TRIG 241 (H) 01/06/2023    HDL 45 01/06/2023     (H) 01/06/2023    AST 24 01/04/2023    ALT 40 (H) 01/04/2023     POCT SARS-CoV-2 Antigen ERICH (03/22/2023 16:47)  POC Rapid Strep A (03/22/2023 16:47)      Medication Review: Medications reviewed and noted  Patient Instructions   Problem List Items Addressed This Visit        ENT    Acute middle ear effusion, bilateral    Current Assessment & Plan     - She will begin a Z-Larry as prescribed.              Gastrointestinal Abdominal     Acute left flank pain    Overview      intermittent pain left flank and back and left lower abdomen resolved yesterday per patient.         Hepatic steatosis    Current Assessment & Plan     - We discussed briefly that the treatment is eating less fats and losing weight.            Genitourinary and Reproductive     Dysuria    Current Assessment & Plan     - Increase fluid intake, especially water.   - Avoid sodas. Fluconazole tablet prescribed in case there is some vaginal yeast.         Relevant Orders    POC Urinalysis Dipstick, Automated (Completed)    Right renal stone    Overview     Punctate non-obstructing R renal stone in upper pole.         Current Assessment & Plan     - Drinking 3 liters of fluid per day, mostly water, is the prevention for further development of kidney stones.            Infectious Diseases    Acute sore throat - Primary    Current Assessment & Plan     - Treat with Z-Larry.   - Use warm salt water gargles 2 or 3 times per day.   - Drinking hot tea with honey and lemon also helps.         Relevant Orders    POC Rapid Strep A (Completed)       Other    Acute cough (Chronic)    Current Assessment & Plan     - Treat with Z-Larry and she may use an over-the-counter cough syrup or throat lozenges if needed.         Relevant Orders    POCT SARS-CoV-2 Antigen ERICH (Completed)          Diagnosis Plan   1. Acute sore throat  POC Rapid Strep A      2. Acute middle ear effusion, bilateral        3. Acute cough  POCT  SARS-CoV-2 Antigen ERICH      4. Dysuria  POC Urinalysis Dipstick, Automated      5. Right renal stone        6. Hepatic steatosis        7. Acute left flank pain                Plan of care reviewed with patient at the conclusion of today's visit. Education was provided regarding diagnosis, management, and any prescribed or recommended OTC medications.Patient verbalizes understanding of and agreement with management plan.     She will follow-up in 08/2023 or sooner as needed.     Transcribed from ambient dictation for Lou Giles MD by Margaret Howard.  03/22/23   17:31 EDT    Patient or patient representative verbalized consent to the visit recording.

## 2023-03-22 NOTE — PATIENT INSTRUCTIONS
Patient Instructions   Problem List Items Addressed This Visit          ENT    Acute middle ear effusion, bilateral    Current Assessment & Plan     - She will begin a Z-Larry as prescribed.              Gastrointestinal Abdominal     Acute left flank pain    Overview      intermittent pain left flank and back and left lower abdomen resolved yesterday per patient.         Hepatic steatosis    Current Assessment & Plan     - We discussed briefly that the treatment is eating less fats and losing weight.            Genitourinary and Reproductive     Dysuria    Current Assessment & Plan     - Increase fluid intake, especially water.   - Avoid sodas. Fluconazole tablet prescribed in case there is some vaginal yeast.         Relevant Orders    POC Urinalysis Dipstick, Automated (Completed)    Right renal stone    Overview     Punctate non-obstructing R renal stone in upper pole.         Current Assessment & Plan     - Drinking 3 liters of fluid per day, mostly water, is the prevention for further development of kidney stones.            Infectious Diseases    Acute sore throat - Primary    Current Assessment & Plan     - Treat with Z-Larry.   - Use warm salt water gargles 2 or 3 times per day.   - Drinking hot tea with honey and lemon also helps.         Relevant Orders    POC Rapid Strep A (Completed)       Other    Acute cough (Chronic)    Current Assessment & Plan     - Treat with Z-Larry and she may use an over-the-counter cough syrup or throat lozenges if needed.         Relevant Orders    POCT SARS-CoV-2 Antigen ERICH (Completed)       BMI for Adults  What is BMI?  Body mass index (BMI) is a number that is calculated from a person's weight and height. BMI can help estimate how much of a person's weight is composed of fat. BMI does not measure body fat directly. Rather, it is an alternative to procedures that directly measure body fat, which can be difficult and expensive.  BMI can help identify people who may be at higher  "risk for certain medical problems.  What are BMI measurements used for?  BMI is used as a screening tool to identify possible weight problems. It helps determine whether a person is obese, overweight, a healthy weight, or underweight.  BMI is useful for:  Identifying a weight problem that may be related to a medical condition or may increase the risk for medical problems.  Promoting changes, such as changes in diet and exercise, to help reach a healthy weight. BMI screening can be repeated to see if these changes are working.  How is BMI calculated?  BMI involves measuring your weight in relation to your height. Both height and weight are measured, and the BMI is calculated from those numbers. This can be done either in English ([place] or metric measurements. Note that charts and online BMI calculators are available to help you find your BMI quickly and easily without having to do these calculations yourself.  To calculate your BMI in English ([place] measurements:    Measure your weight in pounds (lb).  Multiply the number of pounds by 703.  For example, for a person who weighs 180 lb, multiply that number by 703, which equals 126,540.  Measure your height in inches. Then multiply that number by itself to get a measurement called \"inches squared.\"  For example, for a person who is 70 inches tall, the \"inches squared\" measurement is 70 inches x 70 inches, which equals 4,900 inches squared.  Divide the total from step 2 (number of lb x 703) by the total from step 3 (inches squared): 126,540 ÷ 4,900 = 25.8. This is your BMI.  To calculate your BMI in metric measurements:  Measure your weight in kilograms (kg).  Measure your height in meters (m). Then multiply that number by itself to get a measurement called \"meters squared.\"  For example, for a person who is 1.75 m tall, the \"meters squared\" measurement is 1.75 m x 1.75 m, which is equal to 3.1 meters squared.  Divide the number of kilograms (your weight) by the meters " squared number. In this example: 70 ÷ 3.1 = 22.6. This is your BMI.  What do the results mean?  BMI charts are used to identify whether you are underweight, normal weight, overweight, or obese. The following guidelines will be used:  Underweight: BMI less than 18.5.  Normal weight: BMI between 18.5 and 24.9.  Overweight: BMI between 25 and 29.9.  Obese: BMI of 30 or above.  Keep these notes in mind:  Weight includes both fat and muscle, so someone with a muscular build, such as an athlete, may have a BMI that is higher than 24.9. In cases like these, BMI is not an accurate measure of body fat.  To determine if excess body fat is the cause of a BMI of 25 or higher, further assessments may need to be done by a health care provider.  BMI is usually interpreted in the same way for men and women.  Where to find more information  For more information about BMI, including tools to quickly calculate your BMI, go to these websites:  Centers for Disease Control and Prevention: www.cdc.gov  American Heart Association: www.heart.org  National Heart, Lung, and Blood [place]: www.nhlbi.nih.gov  Summary  Body mass index (BMI) is a number that is calculated from a person's weight and height.  BMI may help estimate how much of a person's weight is composed of fat. BMI can help identify those who may be at higher risk for certain medical problems.  BMI can be measured using English measurements or metric measurements.  BMI charts are used to identify whether you are underweight, normal weight, overweight, or obese.  This information is not intended to replace advice given to you by your health care provider. Make sure you discuss any questions you have with your health care provider.  Document Revised: 09/09/2020 Document Reviewed: 07/17/2020  Elsevier Patient Education © 2022 [employer]  Calorie Counting for Weight Loss  Calories are units of energy. Your body needs a certain number of calories from food to keep going  throughout the day. When you eat or drink more calories than your body needs, your body stores the extra calories mostly as fat. When you eat or drink fewer calories than your body needs, your body burns fat to get the energy it needs.  Calorie counting means keeping track of how many calories you eat and drink each day. Calorie counting can be helpful if you need to lose weight. If you eat fewer calories than your body needs, you should lose weight. Ask your health care provider what a healthy weight is for you.  For calorie counting to work, you will need to eat the right number of calories each day to lose a healthy amount of weight per week. A dietitian can help you figure out how many calories you need in a day and will suggest ways to reach your calorie goal.  A healthy amount of weight to lose each week is usually 1-2 lb (0.5-0.9 kg). This usually means that your daily calorie intake should be reduced by 500-750 calories.  Eating 1,200-1,500 calories a day can help most women lose weight.  Eating 1,500-1,800 calories a day can help most men lose weight.  What do I need to know about calorie counting?  Work with your health care provider or dietitian to determine how many calories you should get each day. To meet your daily calorie goal, you will need to:  Find out how many calories are in each food that you would like to eat. Try to do this before you eat.  Decide how much of the food you plan to eat.  Keep a food log. Do this by writing down what you ate and how many calories it had.  To successfully lose weight, it is important to balance calorie counting with a healthy lifestyle that includes regular activity.  Where do I find calorie information?  The number of calories in a food can be found on a Nutrition Facts label. If a food does not have a Nutrition Facts label, try to look up the calories online or ask your dietitian for help.  Remember that calories are listed per serving. If you choose to have  more than one serving of a food, you will have to multiply the calories per serving by the number of servings you plan to eat. For example, the label on a package of bread might say that a serving size is 1 slice and that there are 90 calories in a serving. If you eat 1 slice, you will have eaten 90 calories. If you eat 2 slices, you will have eaten 180 calories.  How do I keep a food log?  After each time that you eat, record the following in your food log as soon as possible:  What you ate. Be sure to include toppings, sauces, and other extras on the food.  How much you ate. This can be measured in cups, ounces, or number of items.  How many calories were in each food and drink.  The total number of calories in the food you ate.  Keep your food log near you, such as in a pocket-sized notebook or on an luc or website on your mobile phone. Some programs will calculate calories for you and show you how many calories you have left to meet your daily goal.  What are some portion-control tips?  Know how many calories are in a serving. This will help you know how many servings you can have of a certain food.  Use a measuring cup to measure serving sizes. You could also try weighing out portions on a kitchen scale. With time, you will be able to estimate serving sizes for some foods.  Take time to put servings of different foods on your favorite plates or in your favorite bowls and cups so you know what a serving looks like.  Try not to eat straight from a food's packaging, such as from a bag or box. Eating straight from the package makes it hard to see how much you are eating and can lead to overeating. Put the amount you would like to eat in a cup or on a plate to make sure you are eating the right portion.  Use smaller plates, glasses, and bowls for smaller portions and to prevent overeating.  Try not to multitask. For example, avoid watching TV or using your computer while eating. If it is time to eat, sit down at a  table and enjoy your food. This will help you recognize when you are full. It will also help you be more mindful of what and how much you are eating.  What are tips for following this plan?  Reading food labels  Check the calorie count compared with the serving size. The serving size may be smaller than what you are used to eating.  Check the source of the calories. Try to choose foods that are high in protein, fiber, and vitamins, and low in saturated fat, trans fat, and sodium.  Shopping  Read nutrition labels while you shop. This will help you make healthy decisions about which foods to buy.  Pay attention to nutrition labels for low-fat or fat-free foods. These foods sometimes have the same number of calories or more calories than the full-fat versions. They also often have added sugar, starch, or salt to make up for flavor that was removed with the fat.  Make a grocery list of lower-calorie foods and stick to it.  Cooking  Try to cook your favorite foods in a healthier way. For example, try baking instead of frying.  Use low-fat dairy products.  Meal planning  Use more fruits and vegetables. One-half of your plate should be fruits and vegetables.  Include lean proteins, such as chicken, turkey, and fish.  Lifestyle  Each week, aim to do one of the followin minutes of moderate exercise, such as walking.  75 minutes of vigorous exercise, such as running.  General information  Know how many calories are in the foods you eat most often. This will help you calculate calorie counts faster.  Find a way of tracking calories that works for you. Get creative. Try different apps or programs if writing down calories does not work for you.  What foods should I eat?    Eat nutritious foods. It is better to have a nutritious, high-calorie food, such as an avocado, than a food with few nutrients, such as a bag of potato chips.  Use your calories on foods and drinks that will fill you up and will not leave you hungry  soon after eating.  Examples of foods that fill you up are nuts and nut butters, vegetables, lean proteins, and high-fiber foods such as whole grains. High-fiber foods are foods with more than 5 g of fiber per serving.  Pay attention to calories in drinks. Low-calorie drinks include water and unsweetened drinks.  The items listed above may not be a complete list of foods and beverages you can eat. Contact a dietitian for more information.  What foods should I limit?  Limit foods or drinks that are not good sources of vitamins, minerals, or protein or that are high in unhealthy fats. These include:  Candy.  Other sweets.  Sodas, specialty coffee drinks, alcohol, and juice.  The items listed above may not be a complete list of foods and beverages you should avoid. Contact a dietitian for more information.  How do I count calories when eating out?  Pay attention to portions. Often, portions are much larger when eating out. Try these tips to keep portions smaller:  Consider sharing a meal instead of getting your own.  If you get your own meal, eat only half of it. Before you start eating, ask for a container and put half of your meal into it.  When available, consider ordering smaller portions from the menu instead of full portions.  Pay attention to your food and drink choices. Knowing the way food is cooked and what is included with the meal can help you eat fewer calories.  If calories are listed on the menu, choose the lower-calorie options.  Choose dishes that include vegetables, fruits, whole grains, low-fat dairy products, and lean proteins.  Choose items that are boiled, broiled, grilled, or steamed. Avoid items that are buttered, battered, fried, or served with cream sauce. Items labeled as crispy are usually fried, unless stated otherwise.  Choose water, low-fat milk, unsweetened iced tea, or other drinks without added sugar. If you want an alcoholic beverage, choose a lower-calorie option, such as a glass of  wine or light beer.  Ask for dressings, sauces, and syrups on the side. These are usually high in calories, so you should limit the amount you eat.  If you want a salad, choose a garden salad and ask for grilled meats. Avoid extra toppings such as seymour, cheese, or fried items. Ask for the dressing on the side, or ask for olive oil and vinegar or lemon to use as dressing.  Estimate how many servings of a food you are given. Knowing serving sizes will help you be aware of how much food you are eating at restaurants.  Where to find more information  Centers for Disease Control and Prevention: www.cdc.gov  [place] Department of Agriculture: myplate.gov  Summary  Calorie counting means keeping track of how many calories you eat and drink each day. If you eat fewer calories than your body needs, you should lose weight.  A healthy amount of weight to lose per week is usually 1-2 lb (0.5-0.9 kg). This usually means reducing your daily calorie intake by 500-750 calories.  The number of calories in a food can be found on a Nutrition Facts label. If a food does not have a Nutrition Facts label, try to look up the calories online or ask your dietitian for help.  Use smaller plates, glasses, and bowls for smaller portions and to prevent overeating.  Use your calories on foods and drinks that will fill you up and not leave you hungry shortly after a meal.  This information is not intended to replace advice given to you by your health care provider. Make sure you discuss any questions you have with your health care provider.  Document Revised: 01/28/2021 Document Reviewed: 01/28/2021  Elsevier Patient Education © 2022 [employer] Inc.

## 2023-03-22 NOTE — ASSESSMENT & PLAN NOTE
- Treat with Z-Larry.   - Use warm salt water gargles 2 or 3 times per day.   - Drinking hot tea with honey and lemon also helps.

## 2023-03-22 NOTE — ASSESSMENT & PLAN NOTE
- Treat with Z-Larry and she may use an over-the-counter cough syrup or throat lozenges if needed.

## 2023-03-22 NOTE — ASSESSMENT & PLAN NOTE
- Increase fluid intake, especially water.   - Avoid sodas. Fluconazole tablet prescribed in case there is some vaginal yeast.

## 2023-09-01 ENCOUNTER — TELEPHONE (OUTPATIENT)
Dept: INTERNAL MEDICINE | Facility: CLINIC | Age: 38
End: 2023-09-01
Payer: COMMERCIAL

## 2023-09-01 ENCOUNTER — OFFICE VISIT (OUTPATIENT)
Dept: INTERNAL MEDICINE | Facility: CLINIC | Age: 38
End: 2023-09-01
Payer: COMMERCIAL

## 2023-09-01 VITALS
WEIGHT: 156 LBS | DIASTOLIC BLOOD PRESSURE: 86 MMHG | HEIGHT: 58 IN | HEART RATE: 88 BPM | TEMPERATURE: 96.8 F | SYSTOLIC BLOOD PRESSURE: 124 MMHG | BODY MASS INDEX: 32.75 KG/M2

## 2023-09-01 DIAGNOSIS — M54.50 ACUTE RIGHT-SIDED LOW BACK PAIN WITHOUT SCIATICA: ICD-10-CM

## 2023-09-01 DIAGNOSIS — R09.81 NASAL CONGESTION: Primary | ICD-10-CM

## 2023-09-01 DIAGNOSIS — J34.89 SINUS PRESSURE: ICD-10-CM

## 2023-09-01 LAB
BILIRUB BLD-MCNC: NEGATIVE MG/DL
CLARITY, POC: CLEAR
COLOR UR: NORMAL
EXPIRATION DATE: NORMAL
EXPIRATION DATE: NORMAL
FLUAV AG UPPER RESP QL IA.RAPID: NOT DETECTED
FLUBV AG UPPER RESP QL IA.RAPID: NOT DETECTED
GLUCOSE UR STRIP-MCNC: NEGATIVE MG/DL
INTERNAL CONTROL: NORMAL
KETONES UR QL: NEGATIVE
LEUKOCYTE EST, POC: NEGATIVE
Lab: NORMAL
Lab: NORMAL
NITRITE UR-MCNC: NEGATIVE MG/ML
PH UR: 6.5 [PH] (ref 5–8)
PROT UR STRIP-MCNC: NEGATIVE MG/DL
RBC # UR STRIP: NEGATIVE /UL
SARS-COV-2 AG UPPER RESP QL IA.RAPID: NOT DETECTED
SP GR UR: 1.02 (ref 1–1.03)
UROBILINOGEN UR QL: NORMAL

## 2023-09-01 RX ORDER — FLUTICASONE PROPIONATE 50 MCG
2 SPRAY, SUSPENSION (ML) NASAL DAILY
Qty: 18.2 ML | Refills: 1 | Status: SHIPPED | OUTPATIENT
Start: 2023-09-01

## 2023-09-01 NOTE — PROGRESS NOTES
Acute Office Visit      Name: Delma Santos    : 1985     MRN: [id number]   Care Team: Patient Care Team:  Lou Giles MD as PCP - General (Internal Medicine)    Chief Complaint  Back Pain (Prescribed pyridium this past  but she was negative for a UTI when seen at urgent care ), Sinus Problem (Nasal congestion and ear ache ), and Shoulder Pain    Subjective     History of Present Illness:  Delma Santos is a 38 y.o. female who presents today for back pain, congestion, earache.    Delma states that she was recently seen at urgent treatment related to back pain, frequency and urgency of urination.  She experienced something similar in March and was diagnosed with a right kidney calculi.  She states that the symptoms resolved however, have returned.    Delma also reports that over the past 2 weeks, she has been experiencing nasal congestion, ear pain, sinus pressure.  She states that most of the symptoms have resolved however, the right ear continues to be painful.    Review of systems was completed, and pertinent findings are noted in the HPI.  Review of Systems   HENT:  Positive for congestion, ear pain and sinus pressure.    Genitourinary:  Positive for frequency and urgency.   Musculoskeletal:  Positive for back pain.   All other systems reviewed and are negative.    Past Medical History:   Diagnosis Date    Anemia     Cholestasis during pregnancy     Gastroesophageal reflux disease 10/21/2022    During pregnancy    Mixed hyperlipidemia 2023       Past Surgical History:   Procedure Laterality Date     SECTION  10/2022    Repeat C SX    POSTPARTUM TUBAL LIGATION  10/2022       Social History     Socioeconomic History    Marital status:    Tobacco Use    Smoking status: Never    Smokeless tobacco: Never   Vaping Use    Vaping Use: Never used   Substance and Sexual Activity    Alcohol use: Yes     Comment: socially    Drug use: Never    Sexual activity:  "Defer     Partners: Male     Birth control/protection: Tubal ligation         Current Outpatient Medications:     fluticasone (FLONASE) 50 MCG/ACT nasal spray, 2 sprays into the nostril(s) as directed by provider Daily., Disp: 18.2 mL, Rfl: 1    Procedures    PHQ-9 Total Score:      Objective     Vital Signs  /86 (BP Location: Left arm, Patient Position: Sitting, Cuff Size: Adult)   Pulse 88   Temp 96.8 øF (36 øC) (Temporal)   Ht 147.3 cm (57.99\")   Wt 70.8 kg (156 lb)   BMI 32.61 kg/mý   Estimated body mass index is 32.61 kg/mý as calculated from the following:    Height as of this encounter: 147.3 cm (57.99\").    Weight as of this encounter: 70.8 kg (156 lb).            Physical Exam  Vitals and nursing note reviewed.   HENT:      Head: Normocephalic.      Right Ear: A middle ear effusion is present.      Left Ear: A middle ear effusion is present.      Nose: Nose normal.      Mouth/Throat:      Mouth: Mucous membranes are moist.      Pharynx: Oropharynx is clear.   Cardiovascular:      Rate and Rhythm: Normal rate.   Pulmonary:      Effort: Pulmonary effort is normal.      Breath sounds: Normal breath sounds.   Skin:     General: Skin is warm and dry.   Neurological:      General: No focal deficit present.      Mental Status: She is alert and oriented to person, place, and time.   Psychiatric:         Mood and Affect: Mood normal.         Behavior: Behavior normal.         Thought Content: Thought content normal.         Judgment: Judgment normal.        @[facility]@    Assessment and Plan     Assessment/Plan:  Diagnoses and all orders for this visit:    1. Nasal congestion (Primary)  Comments:  Flu/COVID testing in office today.  Orders:  -     POC Urinalysis Dipstick, Automated  -Flu/COVID negative in office today.    2. Acute right-sided low back pain without sciatica  Comments:  UA in office today.  Orders:  -     POCT SARS-CoV-2 Antigen ERICH  -     Ambulatory Referral to Urology  -     CT Abdomen " Pelvis Stone Protocol; Future  -UA negative in office today.  -Will obtain CT for further evaluation of previous renal calculi.  Will send to urology for continued symptoms.    3. Sinus pressure  Comments:  Start fluticasone nasal spray-2 sprays each nostril daily.  Orders:  -     fluticasone (FLONASE) 50 MCG/ACT nasal spray; 2 sprays into the nostril(s) as directed by provider Daily.  Dispense: 18.2 mL; Refill: 1         There are no Patient Instructions on file for this visit.  Plan of care reviewed with patient at the conclusion of today's visit. Education was provided regarding diagnosis, management and any prescribed or recommended OTC medications.  Patient verbalizes understanding of and agreement with management plan.    Follow Up  Return for Next Scheduled Follow up.    Audra Tipton APRN

## 2023-09-01 NOTE — TELEPHONE ENCOUNTER
THIS PATIENT IS REQUESTING TO BE SEEN TODAY FOR LOW BACK PAIN AND COVID TESTING. I CALLED THE  AND THEY COULD NOT FIND ANY OPENINGS.  IF ANYONE CAN SEE HER, PLEASE CALL THE PATIENT  [phone].  THE PATIENT NEEDS TO BE SEEN AFTER 11AM.